# Patient Record
Sex: FEMALE | Race: WHITE | NOT HISPANIC OR LATINO | URBAN - NONMETROPOLITAN AREA
[De-identification: names, ages, dates, MRNs, and addresses within clinical notes are randomized per-mention and may not be internally consistent; named-entity substitution may affect disease eponyms.]

---

## 2017-04-12 ENCOUNTER — OFFICE VISIT (OUTPATIENT)
Dept: RETAIL CLINIC | Facility: CLINIC | Age: 42
End: 2017-04-12

## 2017-04-12 VITALS — RESPIRATION RATE: 18 BRPM | OXYGEN SATURATION: 98 % | HEART RATE: 110 BPM | WEIGHT: 126 LBS | TEMPERATURE: 103.1 F

## 2017-04-12 DIAGNOSIS — J10.1 INFLUENZA B: Primary | ICD-10-CM

## 2017-04-12 LAB
EXPIRATION DATE: NORMAL
EXPIRATION DATE: NORMAL
FLUAV AG NPH QL: NEGATIVE
FLUBV AG NPH QL: POSITIVE
INTERNAL CONTROL: NORMAL
INTERNAL CONTROL: NORMAL
Lab: NORMAL
Lab: NORMAL
S PYO AG THROAT QL: NEGATIVE

## 2017-04-12 PROCEDURE — 87880 STREP A ASSAY W/OPTIC: CPT | Performed by: NURSE PRACTITIONER

## 2017-04-12 PROCEDURE — 87804 INFLUENZA ASSAY W/OPTIC: CPT | Performed by: NURSE PRACTITIONER

## 2017-04-12 PROCEDURE — 99213 OFFICE O/P EST LOW 20 MIN: CPT | Performed by: NURSE PRACTITIONER

## 2017-04-12 NOTE — PROGRESS NOTES
Subjective   Renee Jones is a 41 y.o. female.   Chief Complaint   Patient presents with   • Flu Symptoms      HPI Comments: Fever, chills, cough, sore throat, nasal congestion started abruptly 3 days ago.  Taking tylenol/motrin with some relief of fever.  Did have flu vaccine. [place].  Is a local  and worked as a  to a low grade luis bird flu investigation outbreak 3 weeks ago (identified as a strain A flu).  Wore PPE and took all precautions while there.  Participated in the recommended 10 day [facility] quarantine without symptoms and it is outside of the infection window at this point.         Flu Symptoms   Associated symptoms include chills, congestion, coughing (mild dry), fatigue, a fever, headaches and a sore throat. Pertinent negatives include no abdominal pain, chest pain, nausea, rash or vomiting.        The following portions of the patient's history were reviewed and updated as appropriate: allergies, current medications, past family history, past medical history, past social history, past surgical history and problem list.  No current outpatient prescriptions on file.    Review of Systems   Constitutional: Positive for appetite change (slightly decreased), chills, fatigue and fever.        No body aches   HENT: Positive for congestion, postnasal drip, rhinorrhea, sinus pressure, sneezing, sore throat and voice change. Negative for ear pain, facial swelling and mouth sores. Ear discharge: pressure.    Eyes: Negative for discharge.   Respiratory: Positive for cough (mild dry). Negative for chest tightness, shortness of breath and wheezing.    Cardiovascular: Negative for chest pain.   Gastrointestinal: Negative for abdominal pain, diarrhea, nausea and vomiting.   Skin: Negative for rash.   Neurological: Positive for headaches. Negative for dizziness and light-headedness.     Pulse 110  Temp (!) 103.1 °F (39.5 °C) (Temporal Artery )   Resp  18  Wt 126 lb (57.2 kg)  LMP 04/08/2017  SpO2 98%    Objective   No Known Allergies    Physical Exam   Constitutional: She is oriented to person, place, and time. She appears well-developed and well-nourished. She appears ill (mild). No distress.   HENT:   Head: Normocephalic and atraumatic.   Right Ear: External ear normal. Tympanic membrane is not erythematous. A middle ear effusion is present.   Left Ear: External ear normal. Tympanic membrane is not erythematous. A middle ear effusion is present.   Nose: Rhinorrhea present. Right sinus exhibits no maxillary sinus tenderness and no frontal sinus tenderness. Left sinus exhibits no maxillary sinus tenderness and no frontal sinus tenderness.   Mouth/Throat: Posterior oropharyngeal erythema (Mild with clear post nasal drip) present. No oropharyngeal exudate or posterior oropharyngeal edema.   Bilateral TM with mild serous effusion.    Significant nasal congestion.   Eyes: Conjunctivae, EOM and lids are normal.   Neck: Full passive range of motion without pain.   Cardiovascular: Normal rate and regular rhythm.    Pulmonary/Chest: Effort normal and breath sounds normal. No respiratory distress.   Abdominal: Soft. Normal appearance and bowel sounds are normal. There is no tenderness.   Lymphadenopathy:        Head (right side): No tonsillar adenopathy present.        Head (left side): No tonsillar adenopathy present.     She has no cervical adenopathy.   Neurological: She is alert and oriented to person, place, and time.   Skin: Skin is warm and dry. No rash noted.       Assessment/Plan   Renee was seen today for flu symptoms.    Diagnoses and all orders for this visit:    Influenza B  -     POC Rapid Strep A  -     POC Influenza A / B      Results for orders placed or performed in visit on 04/12/17   POC Rapid Strep A   Result Value Ref Range    Rapid Strep A Screen Negative Negative, VALID, INVALID, Not Performed    Internal Control Passed Passed    Lot Number  [id number]     Expiration Date 8/2018    POC Influenza A / B   Result Value Ref Range    Rapid Influenza A Ag Negative     Rapid Influenza B Ag Positive     Internal Control Passed Passed    Lot Number [id number]     Expiration Date 11/2018

## 2017-04-12 NOTE — PATIENT INSTRUCTIONS
"You have tested positive today for influenza or \"the flu.\" Because your symptoms began greater than 48 hours ago, you are not being treated with Tamiflu. This medication will not cure the flu, but it may help with reducing the severity and duration of the symptoms. The flu is a viral illness, and can last 10-14 days before it resolves. Symptomatic treatment is recommended - antibiotics will not help. Take Ibuprofen or Tylenol as needed for fever. Mucinex or Robitussion may help with clearing cough and congestion. Increase your intake of clear, decaffinated fluids and get plenty of rest. For fever that persists beyond 5 days or worsening symptoms, follow up with your primary care provider. Pt verbalized understanding, visit summary given.     "

## 2023-04-13 ENCOUNTER — OFFICE VISIT (OUTPATIENT)
Dept: CARDIOLOGY | Facility: CLINIC | Age: 48
End: 2023-04-13
Payer: COMMERCIAL

## 2023-04-13 VITALS
WEIGHT: 130 LBS | BODY MASS INDEX: 23.04 KG/M2 | HEART RATE: 70 BPM | HEIGHT: 63 IN | DIASTOLIC BLOOD PRESSURE: 76 MMHG | OXYGEN SATURATION: 99 % | SYSTOLIC BLOOD PRESSURE: 117 MMHG

## 2023-04-13 DIAGNOSIS — R00.2 PALPITATIONS: Primary | ICD-10-CM

## 2023-04-13 PROCEDURE — 99204 OFFICE O/P NEW MOD 45 MIN: CPT | Performed by: INTERNAL MEDICINE

## 2023-04-13 RX ORDER — OMEGA-3 FATTY ACIDS/FISH OIL 300-1000MG
CAPSULE ORAL
COMMUNITY

## 2023-04-13 RX ORDER — PROPRANOLOL HYDROCHLORIDE 20 MG/1
20 TABLET ORAL 3 TIMES DAILY PRN
Start: 2023-04-13

## 2023-04-13 NOTE — PROGRESS NOTES
"Subjective   Renee Jones is a 47 y.o. female     Chief Complaint   Patient presents with   • Establish Care     Here for eval.    • Palpitations   • Chest Pain       PROBLEM LIST:     1. Palpitations    Denies Rheumatic / Scarlet fever    Specialty Problems    None        HPI:  Ms. Renee Jones is a 47-year-old patient of Dr. Rey Crowley seen today for evaluation of palpitations.    Ms. Jones was in her usual state of health until December 2022.  She developed an upper respiratory tract infection at that time.  Shortly thereafter she began to notice palpitations which she describes as a \"fluttering\" in her chest.  She had noticed very rare episodes of palpitations in the past but had no symptoms  significant enough to prompt evaluation.  Palpitations were more prolonged and frequent than anything she has experienced in the past.  Symptoms would last for 10 seconds or more, sometimes cause the patient to have a sense of having to catch her breath, but the patient has had no chest pain, shortness of breath, lightheadedness or dizziness.    Ms. Javed saw Dr. Avendaño in February and was started on propranolol on a as needed basis.  She had marked improvement in symptoms on that therapy.  However, shortly thereafter symptoms recurred.  She was changed to longer acting beta-blocker in the form of metoprolol but she had difficulty taking metoprolol on a regular basis because of anergy and dizziness.  Over the last several weeks symptoms have improved but not resolved.    The patient has noticed no change in her functional capacity and she exercises on a regular basis without difficulty.  She denies orthopnea, PND, or lower extremity edema.  She has no symptoms of peripheral arterial disease or symptoms of TIA or stroke.                    PRIOR MEDICATIONS    Current Outpatient Medications on File Prior to Visit   Medication Sig Dispense Refill   • metoprolol tartrate (LOPRESSOR) 25 MG tablet 25 mg at night and " "12.5 mg in am       No current facility-administered medications on file prior to visit.       ALLERGIES:    Patient has no known allergies.    PAST MEDICAL HISTORY:    Past Medical History:   Diagnosis Date   • Palpitation        SURGICAL HISTORY:    Past Surgical History:   Procedure Laterality Date   • APPENDECTOMY         SOCIAL HISTORY:    Social History     Socioeconomic History   • Marital status:    Tobacco Use   • Smoking status: Never   • Smokeless tobacco: Never   Substance and Sexual Activity   • Alcohol use: Yes     Comment: occas. drink   • Drug use: No   • Sexual activity: Defer     Birth control/protection: Surgical       FAMILY HISTORY:    Family History   Problem Relation Age of Onset   • Diabetes Mother    • No Known Problems Father        Review of Systems   Constitutional: Negative.  Negative for chills, diaphoresis, fever and unexpected weight change.   HENT: Negative.    Eyes: Negative.    Respiratory: Negative.         Denies orthopnea/PND   Cardiovascular: Positive for chest pain (started with starting Metoprolol. \"uncomfortableness\". ) and palpitations (can last 10-20 sec's and constant at that times. Not worsened by activity. ). Negative for leg swelling.   Gastrointestinal: Negative.  Negative for blood in stool (denies melena,hematuria,hemoptysis,hematochezia), constipation and diarrhea.   Endocrine: Negative.    Genitourinary: Negative.  Negative for hematuria.   Musculoskeletal: Negative.    Skin: Negative.    Allergic/Immunologic: Negative.    Neurological: Negative.  Negative for dizziness, syncope and light-headedness.        Denies stroke like sx's   Hematological: Negative.    Psychiatric/Behavioral: Negative.        VISIT VITALS:  Vitals:    04/13/23 1137   BP: 117/76   BP Location: Left arm   Patient Position: Sitting   Pulse: 70   SpO2: 99%   Weight: 59 kg (130 lb)   Height: 160 cm (63\")      /76 (BP Location: Left arm, Patient Position: Sitting)   Pulse 70   Ht " "160 cm (63\")   Wt 59 kg (130 lb)   SpO2 99%   BMI 23.03 kg/m²     RECENT LABS:    Objective       Physical Exam  Vitals and nursing note reviewed.   Constitutional:       General: She is not in acute distress.     Appearance: She is well-developed.   HENT:      Head: Normocephalic and atraumatic.   Eyes:      Conjunctiva/sclera: Conjunctivae normal.      Pupils: Pupils are equal, round, and reactive to light.      Comments: No ptosis or lid lag  Extra ocular motions intact  KRYSTIAN     Neck:      Vascular: No carotid bruit, hepatojugular reflux or JVD.      Trachea: No tracheal deviation.      Comments: Nl. Carotid upstrokes  Cardiovascular:      Rate and Rhythm: Normal rate and regular rhythm.      Pulses:           Radial pulses are 2+ on the right side and 2+ on the left side.      Heart sounds: Normal heart sounds, S1 normal and S2 normal. No murmur heard.    No friction rub. No S3 or S4 sounds.   Pulmonary:      Effort: Pulmonary effort is normal.      Breath sounds: Normal breath sounds. No wheezing, rhonchi or rales.      Comments: Nl. Expir. Phase  Nl. Breath sound intensity    Abdominal:      General: Bowel sounds are normal. There is no distension or abdominal bruit.      Palpations: Abdomen is soft. There is no mass.      Tenderness: There is no abdominal tenderness. There is no guarding or rebound.      Comments: No organomegaly   Musculoskeletal:         General: No tenderness or deformity. Normal range of motion.      Cervical back: Normal range of motion and neck supple.      Right lower leg: No edema.      Left lower leg: No edema.      Comments: BLE, no edema, excellent pedal pulses     Skin:     General: Skin is warm and dry.      Coloration: Skin is not pale.      Findings: No erythema or rash.   Neurological:      Mental Status: She is alert and oriented to person, place, and time.   Psychiatric:         Behavior: Behavior normal.         Thought Content: Thought content normal.         " Judgment: Judgment normal.         Procedures      Assessment & Plan   #1.  Palpitations.  The patient describes short-lived episodes of tachypalpitations without significant sequelae.  Somewhat concerning is the fact that these symptoms were associated with an upper respiratory tract infection in December 2022.  Therefore, subclinical myocarditis/cardiomyopathy needs to be a consideration.  Therefore, we will perform echocardiography to assess LV systolic and diastolic function, LV filling pressures, and to exclude pericardial disease.  We will also perform 30-day event monitoring for further assessment of the patient's rhythm abnormalities.    2.  Because of symptoms related to longer acting beta-blocker I have asked Ms. Jones to return to using propranolol on a as needed basis as prescribed by Dr. Avendaño.    3.  The patient will follow with David's office as instructed, and we will plan on seeing her in follow-up after testing or on appearing basis as discussed in detail at the time of today's visit.   Diagnosis Plan   1. Palpitations            No follow-ups on file.         Renee Jones  reports that she has never smoked. She has never used smokeless tobacco.. I have educated her on the risk of diseases from using tobacco products such as cancer, COPD and heart disease.               BMI is within normal parameters. No other follow-up for BMI required.             Electronically signed by:    Scribed for Tony Yao MD by Mariela Gonzales LPN on April 13, 2023  at 11:42 EDT    I, Tony Yao MD personally performed the services described in this documentation as scribed by the above named individual in my presence, and it is both accurate and complete. April 13, 2023 11:42 EDT      Dictated Utilizing Dragon Dictation: Part of this note may be an electronic transcription/translation of spoken language to printed text using the Dragon Dictation System.

## 2023-04-28 ENCOUNTER — HOSPITAL ENCOUNTER (OUTPATIENT)
Dept: CARDIOLOGY | Facility: HOSPITAL | Age: 48
Discharge: HOME | End: 2023-04-28
Payer: COMMERCIAL

## 2023-04-28 VITALS — HEIGHT: 63 IN | BODY MASS INDEX: 23.05 KG/M2 | WEIGHT: 130.07 LBS

## 2023-04-28 DIAGNOSIS — R00.2 PALPITATIONS: ICD-10-CM

## 2023-04-28 LAB
AORTIC DIMENSIONLESS INDEX: 0.81 (DI)
BH CV ECHO MEAS - ACS: 1.35 CM
BH CV ECHO MEAS - AO MAX PG: 6.7 MMHG
BH CV ECHO MEAS - AO MEAN PG: 3.5 MMHG
BH CV ECHO MEAS - AO ROOT DIAM: 2.6 CM
BH CV ECHO MEAS - AO V2 MAX: 129.4 CM/SEC
BH CV ECHO MEAS - AO V2 VTI: 28 CM
BH CV ECHO MEAS - EDV(CUBED): 89.5 ML
BH CV ECHO MEAS - EF(MOD-BP): 66 %
BH CV ECHO MEAS - EF_3D-VOL: 67 %
BH CV ECHO MEAS - ESV(CUBED): 23 ML
BH CV ECHO MEAS - FS: 36.4 %
BH CV ECHO MEAS - IVS/LVPW: 0.92 CM
BH CV ECHO MEAS - IVSD: 0.75 CM
BH CV ECHO MEAS - LA DIMENSION: 3.2 CM
BH CV ECHO MEAS - LAT PEAK E' VEL: 16.5 CM/SEC
BH CV ECHO MEAS - LV MASS(C)D: 110.2 GRAMS
BH CV ECHO MEAS - LV MAX PG: 4.5 MMHG
BH CV ECHO MEAS - LV MEAN PG: 2.06 MMHG
BH CV ECHO MEAS - LV V1 MAX: 105.5 CM/SEC
BH CV ECHO MEAS - LV V1 VTI: 21.8 CM
BH CV ECHO MEAS - LVIDD: 4.5 CM
BH CV ECHO MEAS - LVIDS: 2.8 CM
BH CV ECHO MEAS - LVPWD: 0.82 CM
BH CV ECHO MEAS - MED PEAK E' VEL: 13.3 CM/SEC
BH CV ECHO MEAS - MV A MAX VEL: 38.7 CM/SEC
BH CV ECHO MEAS - MV DEC SLOPE: 270.6 CM/SEC2
BH CV ECHO MEAS - MV DEC TIME: 0.26 MSEC
BH CV ECHO MEAS - MV E MAX VEL: 84.2 CM/SEC
BH CV ECHO MEAS - MV E/A: 2.17
BH CV ECHO MEAS - MV MAX PG: 3.9 MMHG
BH CV ECHO MEAS - MV MEAN PG: 1.55 MMHG
BH CV ECHO MEAS - MV P1/2T: 109.8 MSEC
BH CV ECHO MEAS - MV V2 VTI: 27.7 CM
BH CV ECHO MEAS - MVA(P1/2T): 2 CM2
BH CV ECHO MEAS - PA V2 MAX: 96.6 CM/SEC
BH CV ECHO MEAS - RAP SYSTOLE: 8 MMHG
BH CV ECHO MEAS - RV MAX PG: 2.21 MMHG
BH CV ECHO MEAS - RV V1 MAX: 74.4 CM/SEC
BH CV ECHO MEAS - RV V1 VTI: 15.9 CM
BH CV ECHO MEAS - RVDD: 2.7 CM
BH CV ECHO MEAS - RVSP: 21.3 MMHG
BH CV ECHO MEAS - TAPSE (>1.6): 2.16 CM
BH CV ECHO MEAS - TR MAX PG: 13.3 MMHG
BH CV ECHO MEAS - TR MAX VEL: 182.6 CM/SEC
BH CV ECHO MEASUREMENTS AVERAGE E/E' RATIO: 5.65
BH CV XLRA - TDI S': 11.7 CM/SEC

## 2023-04-28 PROCEDURE — 93306 TTE W/DOPPLER COMPLETE: CPT

## 2023-05-16 ENCOUNTER — TELEPHONE (OUTPATIENT)
Dept: CARDIOLOGY | Facility: CLINIC | Age: 48
End: 2023-05-16
Payer: COMMERCIAL

## 2023-05-16 NOTE — TELEPHONE ENCOUNTER
ECHO RESULTS BRIEFLY DISCUSSED AND AWARE TO KEEP F/U APPT. ADDY,LPN          ----- Message from Tony Yao MD sent at 5/16/2023  3:25 PM EDT -----  Keep f/u appt.   ----- Message -----  From: Tony Yao MD  Sent: 5/14/2023   1:09 PM EDT  To: Tony Yao MD

## 2023-08-11 ENCOUNTER — OFFICE VISIT (OUTPATIENT)
Dept: CARDIOLOGY | Facility: CLINIC | Age: 48
End: 2023-08-11
Payer: COMMERCIAL

## 2023-08-11 VITALS
DIASTOLIC BLOOD PRESSURE: 91 MMHG | SYSTOLIC BLOOD PRESSURE: 121 MMHG | WEIGHT: 134.4 LBS | HEIGHT: 63 IN | HEART RATE: 79 BPM | BODY MASS INDEX: 23.81 KG/M2 | OXYGEN SATURATION: 98 %

## 2023-08-11 DIAGNOSIS — R00.2 PALPITATIONS: Primary | ICD-10-CM

## 2023-08-11 PROCEDURE — 99212 OFFICE O/P EST SF 10 MIN: CPT | Performed by: INTERNAL MEDICINE

## 2023-08-11 NOTE — PROGRESS NOTES
Subjective   Renee Jones is a 48 y.o. female     Chief Complaint   Patient presents with    Follow-up     Here for f/u on echo and event monitor    Palpitations       PROBLEM LIST:     1. Palpitations  1.1 Event Monitor, 4- - 5-. SR, freq. ST as high as 185, SB during sleep  2. Echo, 4-, EF 55-60%, trivial MR / TR / AI, pulm. Pressures low 20's     Denies Rheumatic / Scarlet fever    Specialty Problems          Cardiology Problems    Palpitations             HPI:  Ms. Jones returns for follow-up and testing.    Her palpitations have completely resolved.    Echocardiogram was unremarkable with trivial regurgitant lesions as described above.  Pulmonary pressures were normal and there was no pericardial effusion.    Event monitor demonstrated sinus mechanism throughout with excursions of heart rate into the 180s.  However, the patient had no symptoms while being monitored.                    PRIOR MEDICATIONS    Current Outpatient Medications on File Prior to Visit   Medication Sig Dispense Refill    Ibuprofen 200 MG capsule Take  by mouth. prn      propranolol (INDERAL) 20 MG tablet Take 1 tablet by mouth 3 (Three) Times a Day As Needed (palps). (Patient taking differently: Take 1 tablet by mouth 3 (Three) Times a Day As Needed (palps). Has not taken any since last visit)       No current facility-administered medications on file prior to visit.       ALLERGIES:    Patient has no known allergies.    PAST MEDICAL HISTORY:    Past Medical History:   Diagnosis Date    Palpitation        SURGICAL HISTORY:    Past Surgical History:   Procedure Laterality Date    APPENDECTOMY      COLONOSCOPY W/ POLYPECTOMY         SOCIAL HISTORY:    Social History     Socioeconomic History    Marital status:    Tobacco Use    Smoking status: Never    Smokeless tobacco: Never   Substance and Sexual Activity    Alcohol use: Yes     Comment: occas. drink    Drug use: No    Sexual activity: Defer     Birth  "control/protection: Surgical       FAMILY HISTORY:    Family History   Problem Relation Age of Onset    Diabetes Mother     No Known Problems Father     Coronary artery disease Maternal Grandfather        Review of Systems   Constitutional: Negative.    HENT: Negative.     Eyes:  Positive for visual disturbance (reading glasses).   Respiratory: Negative.     Cardiovascular: Negative.  Palpitations: none since last visit.   Gastrointestinal: Negative.    Endocrine: Negative.    Genitourinary: Negative.    Musculoskeletal:  Positive for arthralgias and myalgias.   Skin: Negative.    Allergic/Immunologic: Negative.    Neurological: Negative.    Hematological: Negative.    Psychiatric/Behavioral: Negative.       VISIT VITALS:  Vitals:    08/11/23 1104   BP: 121/91   BP Location: Left arm   Patient Position: Sitting   Pulse: 79   SpO2: 98%   Weight: 61 kg (134 lb 6.4 oz)   Height: 160 cm (62.99\")      /91 (BP Location: Left arm, Patient Position: Sitting)   Pulse 79   Ht 160 cm (62.99\")   Wt 61 kg (134 lb 6.4 oz)   SpO2 98%   BMI 23.81 kg/mý     RECENT LABS:    Objective       Physical Exam    Procedures      Assessment & Plan   #1.  Palpitations.  Symptoms have resolved, the patient had no dysrhythmia on event monitoring, and echocardiogram was unremarkable as described above.  No further evaluation or therapy is indicated at this time.  Since the patient had symptomatic relief from short acting beta-blocker I recommended to her that she continue that therapy as initiated by Dr. Avendaño on a as needed basis.  Ms. valero will follow with Dr. Avendaño as instructed and will schedule follow-up appointment in our office.  However, if the patient has any significant change in symptoms or inability to control symptoms we would be happy to see her again in follow-up to discuss alternative therapeutic approaches.   Diagnosis Plan   1. Palpitations            No follow-ups on file.         Renee Jones  reports that she " has never smoked. She has never used smokeless tobacco.. I have educated her on the risk of diseases from using tobacco products such as cancer, COPD, and heart disease.               BMI is within normal parameters. No other follow-up for BMI required.               Electronically signed by:    Scribed for Tony Yao MD by Mariela Gonzales LPN on August 11, 2023  at 11:07 EDT    I, Tony Yao MD personally performed the services described in this documentation as scribed by the above named individual in my presence, and it is both accurate and complete. August 11, 2023 11:07 EDT      Dictated Utilizing Dragon Dictation: Part of this note may be an electronic transcription/translation of spoken language to printed text using the Dragon Dictation System.